# Patient Record
Sex: FEMALE | Race: WHITE | NOT HISPANIC OR LATINO | URBAN - METROPOLITAN AREA
[De-identification: names, ages, dates, MRNs, and addresses within clinical notes are randomized per-mention and may not be internally consistent; named-entity substitution may affect disease eponyms.]

---

## 2017-04-11 ENCOUNTER — TRANSFERRED RECORDS (OUTPATIENT)
Dept: HEALTH INFORMATION MANAGEMENT | Facility: CLINIC | Age: 77
End: 2017-04-11

## 2017-05-03 LAB — COPATH REPORT: NORMAL

## 2017-05-03 PROCEDURE — 00000345 ZZHCL STATISTIC REV BONE MARROW OUTSIDE SLIDES TC 88321: Performed by: INTERNAL MEDICINE

## 2017-05-12 ASSESSMENT — ENCOUNTER SYMPTOMS
NECK PAIN: 1
MUSCLE WEAKNESS: 1
ARTHRALGIAS: 1
JOINT SWELLING: 0
BACK PAIN: 1
STIFFNESS: 1
MUSCLE CRAMPS: 0
MYALGIAS: 1

## 2017-05-15 ENCOUNTER — ONCOLOGY VISIT (OUTPATIENT)
Dept: ONCOLOGY | Facility: CLINIC | Age: 77
End: 2017-05-15
Attending: INTERNAL MEDICINE
Payer: MEDICARE

## 2017-05-15 VITALS
OXYGEN SATURATION: 98 % | RESPIRATION RATE: 16 BRPM | HEIGHT: 60 IN | HEART RATE: 97 BPM | WEIGHT: 171.4 LBS | BODY MASS INDEX: 33.65 KG/M2 | TEMPERATURE: 98.1 F | DIASTOLIC BLOOD PRESSURE: 89 MMHG | SYSTOLIC BLOOD PRESSURE: 146 MMHG

## 2017-05-15 DIAGNOSIS — C83.07 SPLENIC MARGINAL ZONE B-CELL LYMPHOMA (H): ICD-10-CM

## 2017-05-15 DIAGNOSIS — Z87.19 HISTORY OF GI BLEED: ICD-10-CM

## 2017-05-15 DIAGNOSIS — D47.3 ESSENTIAL THROMBOCYTHEMIA (H): Primary | ICD-10-CM

## 2017-05-15 PROCEDURE — 99212 OFFICE O/P EST SF 10 MIN: CPT | Mod: ZF

## 2017-05-15 PROCEDURE — 99204 OFFICE O/P NEW MOD 45 MIN: CPT | Mod: ZP | Performed by: INTERNAL MEDICINE

## 2017-05-15 RX ORDER — ERGOCALCIFEROL (VITAMIN D2) 10 MCG
TABLET ORAL DAILY
COMMUNITY
Start: 2009-07-22

## 2017-05-15 RX ORDER — MULTIVITAMIN
TABLET ORAL DAILY
COMMUNITY
Start: 2009-07-22

## 2017-05-15 RX ORDER — HYDROCHLOROTHIAZIDE 25 MG/1
25 TABLET ORAL DAILY
COMMUNITY
Start: 2009-07-22

## 2017-05-15 RX ORDER — ACETAMINOPHEN 500 MG
500 TABLET ORAL DAILY
COMMUNITY
Start: 2009-07-22

## 2017-05-15 RX ORDER — PANTOPRAZOLE SODIUM 40 MG/1
40 TABLET, DELAYED RELEASE ORAL DAILY
COMMUNITY
Start: 2009-07-22

## 2017-05-15 RX ORDER — LISINOPRIL 10 MG/1
10 TABLET ORAL DAILY
COMMUNITY
Start: 2009-07-22

## 2017-05-15 ASSESSMENT — PAIN SCALES - GENERAL: PAINLEVEL: NO PAIN (0)

## 2017-05-15 NOTE — PROGRESS NOTES
REASON FOR CONSULT:  I was asked to see Ms. Lutz by Dr. Washington for recommendations regarding management of splenic marginal zone lymphoma and essential thrombocythemia.      HISTORY OF PRESENT ILLNESS:  Ms. 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  To summarize her course based on our conversation and available records, she was noted to have an elevated platelet count somewhere around 2004 or possibly earlier.  That led to an evaluation by a hematologist and a bone marrow biopsy in 2006.  This was remarkable for some scattered lymphoid aggregates, normal cytogenetics, and was overall felt to be consistent with essential thrombocythemia.  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  A decision was made to treat her with low-dose aspirin; however, she had GI bleed reportedly associated with a gastric ulcer in 2006 so this was stopped.  She was monitored after that time without any specific ET treatment.  Her platelet count appears to have ranged between 800,000 to 1.1 million per uL of blood during this time.  She has not had any apparent thrombotic complications.  She started working with Dr. Washington around 2015, and underwent a repeat bone marrow biopsy in 12/2015 that showed changes consistent with ET, mild reticulin fibrosis, normal cytogenetics, and low-level JAK2 V617F positivity in 2% of granulocytes.  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  She underwent a PET/CT scan which showed no abnormal adenopathy or other evidence of lymphoma or splenomegaly.  There have been no obvious lymphoma related complications, and she has been monitored without any specific lymphoma treatment.  Labs in 04/2017 showed normal immunoglobulin levels.  She  has a waxing and waning modest leukocytosis.  Most recent labs revealed a leukocyte count of 11,600 with absolute lymphocyte count of 6,000, normal hemoglobin, and platelets of 817,000 per uL.  Given the complexity of her hematologic issues, she is here for recommendations regarding management moving forward.    She reports today that she has been essentially asymptomatic throughout her course.  She has good energy.  She walks frequently and is very active.  She has had no problems with infections, fevers, chills or sweats.  She has chronic facial flushing.  She has no eye problems or other neurologic complications.  She has no n/v/d, abdominal pain or fullness.  She has normal bowel function.  She has no blood in the stool or black, tarry stools.  She has no urinary complaints.  Overall she is managing well.     REVIEW OF SYSTEMS:  A complete 10-point review of systems was negative other than noted.    PAST MEDICAL HISTORY:  Essential thrombocytosis and splenic marginal zone lymphoma as noted above, hypertension, hypercholesterolemia, osteoarthritis, GI bleed secondary to gastric ulcer in , liver cysts, macular degeneration, history of left retinal hemorrhage, diverticulosis and fibromyalgia.       PAST SURGICAL HISTORY:  She is status post hysterectomy, breast biopsy, bunion surgery and carpal tunnel release.      FAMILY HISTORY:  She mentioned an aunt with some type of blood disorder, but the specifics are not clear.  Her father  from esophageal cancer.  Her mother  from breast cancer at age 50.  There is no other relevant blood or cancer family history.       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  She does not use tobacco.       MEDICATIONS:  Current Outpatient Prescriptions   Medication     acetaminophen (TYLENOL) 500 MG tablet     amitriptyline (ELAVIL) 25 MG tablet     GABAPENTIN PO     hydrochlorothiazide (HYDRODIURIL) 25 MG  tablet     lisinopril (PRINIVIL/ZESTRIL) 10 MG tablet     Multiple vitamin TABS     pantoprazole (PROTONIX) 40 MG EC tablet     Vitamin D, Cholecalciferol, 400 UNITS TABS     No current facility-administered medications for this visit.      PHYSICAL EXAMINATION:  /89 (BP Location: Right arm, Patient Position: Chair, Cuff Size: Adult Regular)  Pulse 97  Temp 98.1  F (36.7  C) (Oral)  Resp 16  Ht 1.524 m (5')  Wt 77.7 kg (171 lb 6.4 oz)  SpO2 98%  BMI 33.47 kg/m2  Wt Readings from Last 5 Encounters:   05/15/17 77.7 kg (171 lb 6.4 oz)     General: Well appearing white female. No acute distress.  HEENT: Sclerae anicteric. No OP lesions, masses, or tonsilar enlargement.  Lungs: Clear bilaterally without wheezing or crackles.  Heart: Regular rate and rhythm without murmurs.  Gastrointestinal: Bowel sounds present, no tenderness to palpation, spleen tip not palpable.  Extremities: No lower extremity edema.  Lymph:  No cervical, clavicular, axillary, epitrochlear, or inguinal lymphadenopathy.  Neuro: Grossly non-focal.  Skin/access: Facial flushing, No IV access.  Performance status: ECOG 0    LABORATORY DATA:  Reviewed as summarized in the HPI.     IMAGING:  Reviewed as summarized in HPI.       PATHOLOGY:  Reviewed by our hematopathologists confirms a myeloproliferative neoplasm consistent with ET with slightly hypercellular marrow at 40% without dysplasia increase or blasts and increased megakaryocytes with atypia, mild reticulin fibrosis, adequate iron stores, and a low-grade B-cell lymphoma most consistent with marginal zone lymphoma comprising about 20% of the marrow cellularity.  Jak2 mutation in 2% of cells by report.       IMPRESSION AND PLAN:  Ms. Lutz is a 77-year-old woman with coincident splenic marginal zone lymphoma and essential thrombocytosis, here for recommendations regarding management moving forward.       We discussed at length her diagnoses and clinical course since 2004 as outlined  above.  We discussed the spectrum of hematologic malignancies including myeloproliferative neoplasms as well as the spectrum of lymphomas, their natural history and general management strategies.       For her splenic marginal zone lymphoma, we discussed the typically indolent nature of the disease and that in the absence of any disease-related complications the standard management approach is observation.  We discussed that there are lots of very effective treatment options available; however, they are not curative and in the absence of disease complications, treatment is not warranted or helpful.  We discussed the possible types of disease complications including cytopenia, splenomegaly, adenopathy, recurrent infections, fatigue and weight loss and rarely transformation to more aggressive disease.  We discussed the importance that she notify her oncology team immediately if she has any problems and that surveillance imaging in the absence of clinical indications is generally not very helpful or recommended (Elan et al. JCO. 2014;32:1).  At this point, monitoring every 3-6-months with labs and clinic visits is appropriate, and there are no indications for treatment at this time.      We also discussed her ET and the natural history, general management, and potential complications of this condition.  We discussed that most ET patients live a normal lifespan.  ET can rarely lead to problems with increasing fibrosis (post-ET myelofibrosis) or progression to acute leukemia that require more intensive therapy, but not for most patients.  We discussed that the major threatening complication involves arterial or venous thrombosis and in higher risk groups (age >60 and JAK2 mutant) this can exceed 20-25% of patients that can be catastrophic in some cases (Barbui et al. Blood. 2012;120:5128).  Since she falls into the high risk group, I would recommend treatment over observation.  We discussed standard treatment for high  risk patient includes cytoreduction with hydroxyurea combined with daily low-dose baby aspirin and that this regimen has been shown to be very effective at reducing risk with excellent tolerability (Imelda et al. NEJM. 1995;332:1132 and Baltazar et al. NEJM. 2005;353:33).  Given her history of GI bleed, she should be on a proton pump inhibitor indefinitely and be vigilant to look for signs of GI bleeding.  I would recommend starting with a low dose of hydroxyurea and titrating with a goal of a normal platelet count while avoiding anemia and/or neutropenia.  We briefly discussed potential side effects including cytopenias, GI upset, and rarely leg or mouth ulcerations but that for most people these are not a problem.  We also discussed the importance of optimizing other cardiovascular risks like her blood pressure, lipids, and weight and staying active as she has been doing.      At this point, she will continue to work with Dr. Washington closer to home for management of these issues.  I am happy to talk with her or Dr. Washington if I can be helpful moving forward.  She asked many insightful questions and is agreeable with and understands the recommendations and will discuss them further with Dr. Washington moving forward.     We will not arrange scheduled follow up, but provided contact information for our clinic.  Please contact me with any questions.    Simón Cyr MD, PhD   of Medicine  Division of Hematology, Oncology, and Transplantation  [email or web address] email  [phone] office

## 2017-05-15 NOTE — LETTER
5/15/2017       RE: Kati Lutz  [address] [facility] [address]     Dear Colleague,    Thank you for referring your patient, Kati Lutz, to the [facility]. Please see a copy of my visit note below.    REASON FOR CONSULT:  I was asked to see Ms. Lutz by Dr. Washington for recommendations regarding management of splenic marginal zone lymphoma and essential thrombocythemia.      HISTORY OF PRESENT ILLNESS:  Ms. 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  To summarize her course based on our conversation and available records, she was noted to have an elevated platelet count somewhere around 2004 or possibly earlier.  That led to an evaluation by a hematologist and a bone marrow biopsy in 2006.  This was remarkable for some scattered lymphoid aggregates, normal cytogenetics, and was overall felt to be consistent with essential thrombocythemia.  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  A decision was made to treat her with low-dose aspirin; however, she had GI bleed reportedly associated with a gastric ulcer in 2006 so this was stopped.  She was monitored after that time without any specific ET treatment.  Her platelet count appears to have ranged between 800,000 to 1.1 million per uL of blood during this time.  She has not had any apparent thrombotic complications.  She started working with Dr. Washington around 2015, and underwent a repeat bone marrow biopsy in 12/2015 that showed changes consistent with ET, mild reticulin fibrosis, normal cytogenetics, and low-level JAK2 V617F positivity in 2% of granulocytes.  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  She underwent a PET/CT scan which showed no abnormal  adenopathy or other evidence of lymphoma or splenomegaly.  There have been no obvious lymphoma related complications, and she has been monitored without any specific lymphoma treatment.  Labs in 2017 showed normal immunoglobulin levels.  She has a waxing and waning modest leukocytosis.  Most recent labs revealed a leukocyte count of 11,600 with absolute lymphocyte count of 6,000, normal hemoglobin, and platelets of 817,000 per uL.  Given the complexity of her hematologic issues, she is here for recommendations regarding management moving forward.    She reports today that she has been essentially asymptomatic throughout her course.  She has good energy.  She walks frequently and is very active.  She has had no problems with infections, fevers, chills or sweats.  She has chronic facial flushing.  She has no eye problems or other neurologic complications.  She has no n/v/d, abdominal pain or fullness.  She has normal bowel function.  She has no blood in the stool or black, tarry stools.  She has no urinary complaints.  Overall she is managing well.     REVIEW OF SYSTEMS:  A complete 10-point review of systems was negative other than noted.    PAST MEDICAL HISTORY:  Essential thrombocytosis and splenic marginal zone lymphoma as noted above, hypertension, hypercholesterolemia, osteoarthritis, GI bleed secondary to gastric ulcer in , liver cysts, macular degeneration, history of left retinal hemorrhage, diverticulosis and fibromyalgia.       PAST SURGICAL HISTORY:  She is status post hysterectomy, breast biopsy, bunion surgery and carpal tunnel release.      FAMILY HISTORY:  She mentioned an aunt with some type of blood disorder, but the specifics are not clear.  Her father  from esophageal cancer.  Her mother  from breast cancer at age 50.  There is no other relevant blood or cancer family history.       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  She does not use tobacco.       MEDICATIONS:  Current Outpatient Prescriptions   Medication     acetaminophen (TYLENOL) 500 MG tablet     amitriptyline (ELAVIL) 25 MG tablet     GABAPENTIN PO     hydrochlorothiazide (HYDRODIURIL) 25 MG tablet     lisinopril (PRINIVIL/ZESTRIL) 10 MG tablet     Multiple vitamin TABS     pantoprazole (PROTONIX) 40 MG EC tablet     Vitamin D, Cholecalciferol, 400 UNITS TABS     No current facility-administered medications for this visit.      PHYSICAL EXAMINATION:  /89 (BP Location: Right arm, Patient Position: Chair, Cuff Size: Adult Regular)  Pulse 97  Temp 98.1  F (36.7  C) (Oral)  Resp 16  Ht 1.524 m (5')  Wt 77.7 kg (171 lb 6.4 oz)  SpO2 98%  BMI 33.47 kg/m2  Wt Readings from Last 5 Encounters:   05/15/17 77.7 kg (171 lb 6.4 oz)     General: Well appearing white female. No acute distress.  HEENT: Sclerae anicteric. No OP lesions, masses, or tonsilar enlargement.  Lungs: Clear bilaterally without wheezing or crackles.  Heart: Regular rate and rhythm without murmurs.  Gastrointestinal: Bowel sounds present, no tenderness to palpation, spleen tip not palpable.  Extremities: No lower extremity edema.  Lymph:  No cervical, clavicular, axillary, epitrochlear, or inguinal lymphadenopathy.  Neuro: Grossly non-focal.  Skin/access: Facial flushing, No IV access.  Performance status: ECOG 0    LABORATORY DATA:  Reviewed as summarized in the HPI.     IMAGING:  Reviewed as summarized in HPI.       PATHOLOGY:  Reviewed by our hematopathologists confirms a myeloproliferative neoplasm consistent with ET with slightly hypercellular marrow at 40% without dysplasia increase or blasts and increased megakaryocytes with atypia, mild reticulin fibrosis, adequate iron stores, and a low-grade B-cell lymphoma most consistent with marginal zone lymphoma comprising about 20% of the marrow cellularity.  Jak2 mutation in 2% of cells by report.       IMPRESSION AND PLAN:  Ms. Lutz is a  77-year-old woman with coincident splenic marginal zone lymphoma and essential thrombocytosis, here for recommendations regarding management moving forward.       We discussed at length her diagnoses and clinical course since 2004 as outlined above.  We discussed the spectrum of hematologic malignancies including myeloproliferative neoplasms as well as the spectrum of lymphomas, their natural history and general management strategies.       For her splenic marginal zone lymphoma, we discussed the typically indolent nature of the disease and that in the absence of any disease-related complications the standard management approach is observation.  We discussed that there are lots of very effective treatment options available; however, they are not curative and in the absence of disease complications, treatment is not warranted or helpful.  We discussed the possible types of disease complications including cytopenia, splenomegaly, adenopathy, recurrent infections, fatigue and weight loss and rarely transformation to more aggressive disease.  We discussed the importance that she notify her oncology team immediately if she has any problems and that surveillance imaging in the absence of clinical indications is generally not very helpful or recommended (Elan et al. JCO. 2014;32:1).  At this point, monitoring every 3-6-months with labs and clinic visits is appropriate, and there are no indications for treatment at this time.      We also discussed her ET and the natural history, general management, and potential complications of this condition.  We discussed that most ET patients live a normal lifespan.  ET can rarely lead to problems with increasing fibrosis (post-ET myelofibrosis) or progression to acute leukemia that require more intensive therapy, but not for most patients.  We discussed that the major threatening complication involves arterial or venous thrombosis and in higher risk groups (age >60 and JAK2 mutant)  this can exceed 20-25% of patients that can be catastrophic in some cases (Barbui et al. Blood. 2012;120:5128).  Since she falls into the high risk group, I would recommend treatment over observation.  We discussed standard treatment for high risk patient includes cytoreduction with hydroxyurea combined with daily low-dose baby aspirin and that this regimen has been shown to be very effective at reducing risk with excellent tolerability (Imelda et al. NEJM. 1995;332:1132 and Baltazar et al. NEJ. 2005;353:33).  Given her history of GI bleed, she should be on a proton pump inhibitor indefinitely and be vigilant to look for signs of GI bleeding.  I would recommend starting with a low dose of hydroxyurea and titrating with a goal of a normal platelet count while avoiding anemia and/or neutropenia.  We briefly discussed potential side effects including cytopenias, GI upset, and rarely leg or mouth ulcerations but that for most people these are not a problem.  We also discussed the importance of optimizing other cardiovascular risks like her blood pressure, lipids, and weight and staying active as she has been doing.      At this point, she will continue to work with Dr. Washington closer to home for management of these issues.  I am happy to talk with her or Dr. Washington if I can be helpful moving forward.  She asked many insightful questions and is agreeable with and understands the recommendations and will discuss them further with Dr. Washington moving forward.     We will not arrange scheduled follow up, but provided contact information for our clinic.  Please contact me with any questions.    Simón Cyr MD, PhD   of Medicine  Division of Hematology, Oncology, and Transplantation  [email or web address] email  [phone] office

## 2017-05-15 NOTE — MR AVS SNAPSHOT
After Visit Summary   5/15/2017    Kati Lutz    MRN: [id number]           Patient Information     Date Of Birth          1940        Visit Information        Provider Department      5/15/2017 10:30 AM Simón Cyr MD [facility]        Today's Diagnoses     Essential thrombocythemia (H)    -  1    Splenic marginal zone b-cell lymphoma (H)        History of GI bleed           Follow-ups after your visit        Who to contact     If you have questions or need follow up information about today's clinic visit or your schedule please contact [facility] directly at [phone].  Normal or non-critical lab and imaging results will be communicated to you by MyChart, letter or phone within 4 business days after the clinic has received the results. If you do not hear from us within 7 days, please contact the clinic through [email or web address] or phone. If you have a critical or abnormal lab result, we will notify you by phone as soon as possible.  Submit refill requests through [employer] or call your pharmacy and they will forward the refill request to us. Please allow 3 business days for your refill to be completed.          Additional Information About Your Visit        MyChart Information     [employer] gives you secure access to your electronic health record. If you see a primary care provider, you can also send messages to your care team and make appointments. If you have questions, please call your primary care clinic.  If you do not have a primary care provider, please call [phone] and they will assist you.        Care EveryWhere ID     This is your Care EveryWhere ID. This could be used by other organizations to access your [facility] medical records  [id number]        Your Vitals Were     Pulse Temperature Respirations Height Pulse Oximetry BMI (Body Mass Index)    97 98.1  F (36.7  C) (Oral) 16 1.524 m (5') 98% 33.47 kg/m2       Blood Pressure from  Last 3 Encounters:   05/15/17 146/89    Weight from Last 3 Encounters:   05/15/17 77.7 kg (171 lb 6.4 oz)              Today, you had the following     No orders found for display       Primary Care Provider    None Specified       No primary provider on file.        Thank you!     Thank you for choosing [facility]  for your care. Our goal is always to provide you with excellent care. Hearing back from our patients is one way we can continue to improve our services. Please take a few minutes to complete the written survey that you may receive in the mail after your visit with us. Thank you!             Your Updated Medication List - Protect others around you: Learn how to safely use, store and throw away your medicines at www.disposemymeds.org.          This list is accurate as of: 5/15/17 11:59 PM.  Always use your most recent med list.                   Brand Name Dispense Instructions for use    acetaminophen 500 MG tablet    TYLENOL     Take 500 mg by mouth daily       amitriptyline 25 MG tablet    ELAVIL     Take 25 mg by mouth At Bedtime       GABAPENTIN PO      Take 300 mg by mouth At Bedtime       hydrochlorothiazide 25 MG tablet    HYDRODIURIL     Take 25 mg by mouth daily       lisinopril 10 MG tablet    PRINIVIL/ZESTRIL     Take 10 mg by mouth daily       Multiple vitamin Tabs      Take by mouth daily       PROTONIX 40 MG EC tablet   Generic drug:  pantoprazole      Take 40 mg by mouth daily       Vitamin D (Cholecalciferol) 400 UNITS Tabs      Take by mouth daily

## 2017-05-15 NOTE — NURSING NOTE
Oncology Rooming Note    May 15, 2017 10:51 AM   Kati Lutz is a 77 year old female who presents for:    Chief Complaint   Patient presents with     Oncology Clinic Visit     New Patient- Marginal zone Lymphoma Consult     Initial Vitals: /89 (BP Location: Right arm, Patient Position: Chair, Cuff Size: Adult Regular)  Pulse 97  Temp 98.1  F (36.7  C) (Oral)  Resp 16  Ht 1.524 m (5')  Wt 77.7 kg (171 lb 6.4 oz)  SpO2 98%  BMI 33.47 kg/m2 Estimated body mass index is 33.47 kg/(m^2) as calculated from the following:    Height as of this encounter: 1.524 m (5').    Weight as of this encounter: 77.7 kg (171 lb 6.4 oz). Body surface area is 1.81 meters squared.  No Pain (0) Comment: Data Unavailable   No LMP recorded. Patient has had a hysterectomy.  Allergies reviewed: Yes  Medications reviewed: Yes    Medications: Medication refills not needed today.  Pharmacy name entered into EPIC: Data Unavailable    Clinical concerns:  provider was notified.    7 minutes for nursing intake (face to face time)     Fay Ingram CMA

## 2017-05-16 PROBLEM — D47.3 ESSENTIAL THROMBOCYTHEMIA (H): Status: ACTIVE | Noted: 2017-05-16

## 2017-05-16 PROBLEM — Z87.19 HISTORY OF GI BLEED: Status: ACTIVE | Noted: 2017-05-16

## 2017-05-16 PROBLEM — C83.07 SPLENIC MARGINAL ZONE B-CELL LYMPHOMA (H): Status: ACTIVE | Noted: 2017-05-16

## 2020-03-02 ENCOUNTER — HEALTH MAINTENANCE LETTER (OUTPATIENT)
Age: 80
End: 2020-03-02

## 2020-12-20 ENCOUNTER — HEALTH MAINTENANCE LETTER (OUTPATIENT)
Age: 80
End: 2020-12-20

## 2021-04-18 ENCOUNTER — HEALTH MAINTENANCE LETTER (OUTPATIENT)
Age: 81
End: 2021-04-18

## 2021-10-03 ENCOUNTER — HEALTH MAINTENANCE LETTER (OUTPATIENT)
Age: 81
End: 2021-10-03

## 2022-05-14 ENCOUNTER — HEALTH MAINTENANCE LETTER (OUTPATIENT)
Age: 82
End: 2022-05-14

## 2022-09-04 ENCOUNTER — HEALTH MAINTENANCE LETTER (OUTPATIENT)
Age: 82
End: 2022-09-04

## 2023-06-03 ENCOUNTER — HEALTH MAINTENANCE LETTER (OUTPATIENT)
Age: 83
End: 2023-06-03